# Patient Record
Sex: MALE | Race: WHITE
[De-identification: names, ages, dates, MRNs, and addresses within clinical notes are randomized per-mention and may not be internally consistent; named-entity substitution may affect disease eponyms.]

---

## 2018-08-30 ENCOUNTER — HOSPITAL ENCOUNTER (OUTPATIENT)
Dept: HOSPITAL 76 - SDS | Age: 68
Discharge: HOME | End: 2018-08-30
Attending: OPHTHALMOLOGY
Payer: MEDICARE

## 2018-08-30 VITALS — DIASTOLIC BLOOD PRESSURE: 69 MMHG | SYSTOLIC BLOOD PRESSURE: 115 MMHG

## 2018-08-30 DIAGNOSIS — Z79.84: ICD-10-CM

## 2018-08-30 DIAGNOSIS — Z85.46: ICD-10-CM

## 2018-08-30 DIAGNOSIS — I10: ICD-10-CM

## 2018-08-30 DIAGNOSIS — H25.812: Primary | ICD-10-CM

## 2018-08-30 DIAGNOSIS — G47.30: ICD-10-CM

## 2018-08-30 DIAGNOSIS — E11.9: ICD-10-CM

## 2018-08-30 DIAGNOSIS — E78.00: ICD-10-CM

## 2018-08-30 DIAGNOSIS — Z79.82: ICD-10-CM

## 2018-08-30 PROCEDURE — 66984 XCAPSL CTRC RMVL W/O ECP: CPT

## 2018-08-30 PROCEDURE — 08RK3JZ REPLACEMENT OF LEFT LENS WITH SYNTHETIC SUBSTITUTE, PERCUTANEOUS APPROACH: ICD-10-PCS | Performed by: OPHTHALMOLOGY

## 2018-08-30 NOTE — ANESTHESIA
Pre-Anesthesia VS, & Labs





- Diagnosis





senile combined cataract





- Procedure





extraction cataract with lens implant


Vital Signs: 





 











Temp Pulse Resp BP Pulse Ox


 


 36.4 C L     14   131/78 H  99 


 


 08/30/18 06:31     08/30/18 06:31  08/30/18 06:31  08/30/18 06:31














 





Height                           5 ft 11 in


Weight (kg)                      87.2 kg











- NPO


>8 hours





- Lab Results


Lab results reviewed: Yes





Home Medications and Allergies


Home Medications: 


 Ambulatory Orders











 Medication  Instructions  Recorded  Confirmed


 


Aspirin [Adult Aspirin] 81 mg PO DAILY 08/29/18 08/29/18


 


Atorvastatin Calcium 40 mg PO DAILY 08/29/18 08/29/18


 


Brimonidine 0.1% Ophth Drops 1 drops OPTH BID 08/29/18 08/29/18





[Alphagan P 0.1% Ophth Drops]   


 


Lisinopril [Prinivil] 20 mg PO DAILY 08/29/18 08/29/18


 


Multivitamin [Multivitamins] 1 each PO DAILY 08/29/18 08/29/18


 


Timolol [Betimol] 5 ml OP BID 08/29/18 08/29/18


 


metFORMIN [Glucophage] 500 mg PO ONCE 08/29/18 08/29/18











Allergies/Adverse Reactions: 


 Allergies











Allergy/AdvReac Type Severity Reaction Status Date / Time


 


No Known Drug Allergies Allergy   Verified 08/29/18 13:07














Anes History & Medical History





- Anesthetic History


Anesthesia Complications: reports: No previous complications


Family history of Anesthesia Complications: Denies


Family history of Malignant Hyperthermia: Denies





- Medical History


Cardiovascular: reports: Hypertension


Pulmonary: reports: Sleep apnea


Gastrointestinal: reports: None


Urinary: reports: None


Musculoskeletal: reports: None


Endocrine/Autoimmune: reports: Type 2 diabetes


Skin: reports: None





- Surgical History


Eyes Ears Nose Throat (EENT): Tonsil/Adenoidectomy, Other


Urologic: Prostatic surgery





Exam


General: Alert, Oriented x3, Cooperative, No acute distress


Dental: WNL


Mouth Opening: 3 Fingerbreadth


Neck Mobility: Normal


Mallampati classification: II


Thyromental Distance: greater than 6 cm


Respiratory: Lungs clear, Normal breath sounds, No respiratory distress, No 

accessory muscle use


Cardiovascular: Regular rate, Normal S1, Normal S2, No murmurs


Mental/Cognitive Status: Alert/Oriented X3, Normal for patient





Plan


Anesthesia Type: MAC


Consent for Procedure(s) Verified and Reviewed: Yes


Code Status: Attempt Resuscitation


ASA classification: 2-Mild systemic disease


Is this case an emergency?: No

## 2018-08-30 NOTE — OPERATIVE REPORT
DATE OF SERVICE: 08/30/2018

Physician: Keegan Uriostegui MD

 

PREOPERATIVE DIAGNOSIS:  Visually significant cataract, right eye.  Cataract 
surgery was performed on the left eye in 2012 by another surgeon.

 

POSTOPERATIVE DIAGNOSIS:  Visually significant cataract, right eye.  Cataract 
surgery was performed on the left eye in 2012 by another surgeon.

 

PROCEDURE:  Phacoemulsification with posterior chamber intraocular lens implant
, right eye.

 

SURGEON:  Keegan Uriostegui MD

 

ANESTHESIA:  Monitored anesthesia care.

 

COMPLICATIONS:  None.

 

OPERATIVE INDICATIONS:  This is a 67-year-old man with progressive vision loss 
in the right eye due to 3+ nuclear sclerotic, 1+ cortical, and trace anterior 
subcapsular cataract.  Best corrected visual acuity was 20/20, with glare to 20/
40 in the right eye.  Indications for surgery are overall decrease in vision, 
difficulty seeing street signs, difficulty driving in low light or at night, 
difficulty driving at night because of headlights from other vehicles, and/or 
street lights, and difficulty with glare or bright lights in any situation.  He 
was consented at length concerning risks and benefits of cataract surgery, 
after which he expressed a desire to proceed with surgery.

 

OPERATIVE PROCEDURE:  The patient was taken to OR #3 and placed under monitored 
anesthesia care.  A surgical timeout was conducted confirming correct patient, 
correct procedure, and correct surgical site.  He was given topical anesthesia 
and prepped and draped in the usual sterile fashion.  The eye was entered at 
the 12 and 9 o'clock positions.  Intracameral Shugarcaine was injected into the 
anterior chamber, followed by Viscoat.  A continuous-tear curvilinear 
capsulorrhexis was performed.  The nucleus was hydrodissected and 
phacoemulsified. The cortex evacuated using automated infusion and aspiration (I
&A).  Provisc was injected in the capsular bag and a 19.5 diopter intraocular 
lens inserted in the bag.  Approximately 0.8 mL of a mixture of triamcinolone, 
moxifloxacin, and vancomycin was injected subconjunctivally in the superior 
quadrant for infection and inflammation prophylaxis.  I&A was used to evacuate 
viscoelastic material.  The eye was inflated to physiologic pressure using 
balanced salt solution and found to be watertight.  The patient was taken from 
the operating room in good condition and given postoperative instructions.

 

 

DD: 08/30/2018 08:02

TD: 08/30/2018 08:08

Job #: [id number]

[facility] HospitalJONELLE